# Patient Record
Sex: FEMALE | Race: WHITE | Employment: UNEMPLOYED | URBAN - METROPOLITAN AREA
[De-identification: names, ages, dates, MRNs, and addresses within clinical notes are randomized per-mention and may not be internally consistent; named-entity substitution may affect disease eponyms.]

---

## 2017-01-03 ENCOUNTER — HOSPITAL ENCOUNTER (EMERGENCY)
Age: 47
Discharge: HOME | End: 2017-01-03
Attending: EMERGENCY MEDICINE
Payer: SELF-PAY

## 2017-01-03 VITALS
DIASTOLIC BLOOD PRESSURE: 70 MMHG | SYSTOLIC BLOOD PRESSURE: 108 MMHG | HEART RATE: 72 BPM | TEMPERATURE: 98.3 F | WEIGHT: 165 LBS | OXYGEN SATURATION: 99 % | BODY MASS INDEX: 27.49 KG/M2 | RESPIRATION RATE: 18 BRPM | HEIGHT: 65 IN

## 2017-01-03 DIAGNOSIS — R10.13 ABDOMINAL PAIN, EPIGASTRIC: Primary | ICD-10-CM

## 2017-01-03 LAB
ALBUMIN SERPL BCP-MCNC: 3.9 G/DL (ref 3.5–5)
ALBUMIN/GLOB SERPL: 1 {RATIO} (ref 1.2–3.5)
ALP SERPL-CCNC: 59 U/L (ref 50–136)
ALT SERPL-CCNC: 25 U/L (ref 12–65)
ANION GAP BLD CALC-SCNC: 9 MMOL/L (ref 7–16)
AST SERPL W P-5'-P-CCNC: 19 U/L (ref 15–37)
ATRIAL RATE: 75 BPM
BASOPHILS # BLD AUTO: 0 K/UL (ref 0–0.2)
BASOPHILS # BLD: 1 % (ref 0–2)
BILIRUB SERPL-MCNC: 0.3 MG/DL (ref 0.2–1.1)
BUN SERPL-MCNC: 8 MG/DL (ref 6–23)
CALCIUM SERPL-MCNC: 10 MG/DL (ref 8.3–10.4)
CALCULATED P AXIS, ECG09: 75 DEGREES
CALCULATED R AXIS, ECG10: 53 DEGREES
CALCULATED T AXIS, ECG11: 47 DEGREES
CHLORIDE SERPL-SCNC: 109 MMOL/L (ref 98–107)
CO2 SERPL-SCNC: 24 MMOL/L (ref 21–32)
CREAT SERPL-MCNC: 0.59 MG/DL (ref 0.6–1)
DIAGNOSIS, 93000: NORMAL
DIASTOLIC BP, ECG02: NORMAL MMHG
DIFFERENTIAL METHOD BLD: NORMAL
EOSINOPHIL # BLD: 0.3 K/UL (ref 0–0.8)
EOSINOPHIL NFR BLD: 3 % (ref 0.5–7.8)
ERYTHROCYTE [DISTWIDTH] IN BLOOD BY AUTOMATED COUNT: 12.9 % (ref 11.9–14.6)
GLOBULIN SER CALC-MCNC: 4 G/DL (ref 2.3–3.5)
GLUCOSE SERPL-MCNC: 136 MG/DL (ref 65–100)
HCT VFR BLD AUTO: 43.8 % (ref 35.8–46.3)
HGB BLD-MCNC: 14.6 G/DL (ref 11.7–15.4)
IMM GRANULOCYTES # BLD: 0 K/UL (ref 0–0.5)
IMM GRANULOCYTES NFR BLD AUTO: 0.2 % (ref 0–5)
LIPASE SERPL-CCNC: 70 U/L (ref 73–393)
LYMPHOCYTES # BLD AUTO: 25 % (ref 13–44)
LYMPHOCYTES # BLD: 2 K/UL (ref 0.5–4.6)
MCH RBC QN AUTO: 32.5 PG (ref 26.1–32.9)
MCHC RBC AUTO-ENTMCNC: 33.3 G/DL (ref 31.4–35)
MCV RBC AUTO: 97.6 FL (ref 79.6–97.8)
MONOCYTES # BLD: 0.7 K/UL (ref 0.1–1.3)
MONOCYTES NFR BLD AUTO: 8 % (ref 4–12)
NEUTS SEG # BLD: 5.2 K/UL (ref 1.7–8.2)
NEUTS SEG NFR BLD AUTO: 63 % (ref 43–78)
P-R INTERVAL, ECG05: 174 MS
PLATELET # BLD AUTO: 206 K/UL (ref 150–450)
PMV BLD AUTO: 10.9 FL (ref 10.8–14.1)
POTASSIUM SERPL-SCNC: 3.7 MMOL/L (ref 3.5–5.1)
PROT SERPL-MCNC: 7.9 G/DL (ref 6.3–8.2)
Q-T INTERVAL, ECG07: 364 MS
QRS DURATION, ECG06: 74 MS
QTC CALCULATION (BEZET), ECG08: 406 MS
RBC # BLD AUTO: 4.49 M/UL (ref 4.05–5.25)
SODIUM SERPL-SCNC: 142 MMOL/L (ref 136–145)
SYSTOLIC BP, ECG01: NORMAL MMHG
TROPONIN I BLD-MCNC: 0 NG/ML (ref 0–0.08)
TROPONIN I BLD-MCNC: 0 NG/ML (ref 0–0.08)
VENTRICULAR RATE, ECG03: 75 BPM
WBC # BLD AUTO: 8.2 K/UL (ref 4.3–11.1)

## 2017-01-03 PROCEDURE — 74011000250 HC RX REV CODE- 250: Performed by: EMERGENCY MEDICINE

## 2017-01-03 PROCEDURE — 84484 ASSAY OF TROPONIN QUANT: CPT

## 2017-01-03 PROCEDURE — 85025 COMPLETE CBC W/AUTO DIFF WBC: CPT | Performed by: EMERGENCY MEDICINE

## 2017-01-03 PROCEDURE — 74011250637 HC RX REV CODE- 250/637: Performed by: EMERGENCY MEDICINE

## 2017-01-03 PROCEDURE — 99285 EMERGENCY DEPT VISIT HI MDM: CPT | Performed by: EMERGENCY MEDICINE

## 2017-01-03 PROCEDURE — 93005 ELECTROCARDIOGRAM TRACING: CPT | Performed by: EMERGENCY MEDICINE

## 2017-01-03 PROCEDURE — 83690 ASSAY OF LIPASE: CPT | Performed by: EMERGENCY MEDICINE

## 2017-01-03 PROCEDURE — 80053 COMPREHEN METABOLIC PANEL: CPT | Performed by: EMERGENCY MEDICINE

## 2017-01-03 RX ORDER — LIDOCAINE HYDROCHLORIDE 20 MG/ML
15 SOLUTION OROPHARYNGEAL
Status: COMPLETED | OUTPATIENT
Start: 2017-01-03 | End: 2017-01-03

## 2017-01-03 RX ORDER — OMEPRAZOLE 10 MG/1
10 CAPSULE, DELAYED RELEASE ORAL DAILY
Qty: 30 CAP | Refills: 2 | Status: SHIPPED | OUTPATIENT
Start: 2017-01-03 | End: 2017-02-02

## 2017-01-03 RX ADMIN — Medication 30 ML: at 07:31

## 2017-01-03 RX ADMIN — LIDOCAINE HYDROCHLORIDE 15 ML: 20 SOLUTION ORAL; TOPICAL at 07:31

## 2017-01-03 NOTE — ED PROVIDER NOTES
HPI Comments: Patient states she has been having epigastric pain since 11 PM.  She describes it as constant burning pressure seemingly worse when she lies flat. She has been eating and drinking without any difficulty. She denies similar pain in the past and did not take any medicine for her symptoms. She denies any shortness of breath or diaphoresis. She did have some nausea and vomiting earlier. Elements of this note were made using speech recognition software. As such, errors of speech recognition may be present. Patient is a 42 y.o. female presenting with epigastric pain. The history is provided by the patient. Epigastric Pain    Associated symptoms include nausea and vomiting. Pertinent negatives include no fever, no diarrhea and no constipation.         Past Medical History:   Diagnosis Date    Asthma     B12 deficiency     Bowel dysfunction     Cervical radiculopathy     Depression      pt states wrong info    Groin abscess     History of DVT (deep vein thrombosis) 2015     Left leg-tx with coumadin 2 years    Left leg swelling 2015    Migraine     Nipple discharge     Panic attacks     Post-phlebitic syndrome 2015    Spinal stenosis in cervical region     [facility]romboembolus [facility])      Left leg    Vitamin D deficiency        Past Surgical History:   Procedure Laterality Date    Hx craniotomy        due to head trauma    Hx  section  , , , 2003     x 4    Hx oophorectomy Left 1996    Hx hysterectomy      Hx hernia repair      Hx craniotomy       for head trauma         Family History:   Problem Relation Age of Onset    Breast Cancer Maternal Aunt 44    Other Mother      DVT, VV, coagulopathy    Cancer Mother      , colon, , stomach    Heart Disease Father     Dementia Maternal Grandmother     Other Maternal Grandmother      coagulopathy    Cancer Maternal Grandmother      thyroid, colon    Diabetes Maternal Grandfather  Other Sister      coagulopathy    No Known Problems Paternal Grandmother     No Known Problems Paternal Grandfather     No Known Problems Brother     No Known Problems Sister     No Known Problems Sister        Social History     Social History    Marital status:      Spouse name: N/A    Number of children: N/A    Years of education: N/A     Occupational History    Not on file. Social History Main Topics    Smoking status: Former Smoker     Quit date: 1/29/2010    Smokeless tobacco: Never Used    Alcohol use No      Comment: Social    Drug use: No    Sexual activity: Yes     Partners: Male     Birth control/ protection: Surgical     Other Topics Concern    Not on file     Social History Narrative         ALLERGIES: Aspirin; Ibuprofen; Other plant, animal, environmental; Prednisone; and Tramadol hcl    Review of Systems   Constitutional: Negative for chills and fever. Gastrointestinal: Positive for nausea and vomiting. Negative for constipation and diarrhea. All other systems reviewed and are negative. Vitals:    01/03/17 0553 01/03/17 0618 01/03/17 0626 01/03/17 0640   BP:   107/70 108/68   Pulse:  66 62 67   Resp:  15 14 16   Temp:       SpO2: 96% 96% 95% 95%   Weight:       Height:                Physical Exam   Constitutional: She is oriented to person, place, and time. She appears well-developed and well-nourished. HENT:   Head: Normocephalic and atraumatic. Eyes: Conjunctivae are normal. Pupils are equal, round, and reactive to light. Neck: Normal range of motion. Neck supple. Cardiovascular: Normal rate and regular rhythm. Pulmonary/Chest: Effort normal and breath sounds normal.   Abdominal: Soft. Bowel sounds are normal.   Musculoskeletal: She exhibits no edema or tenderness. Neurological: She is alert and oriented to person, place, and time. Skin: Skin is warm and dry. Psychiatric: She has a normal mood and affect.  Her behavior is normal.   Nursing note and vitals reviewed. MDM  Number of Diagnoses or Management Options  Diagnosis management comments: differential diagnosis: gastritis, biliary colic, pancreatitis, ACS  7:40 AM discussed results with the patient. She didn't have much of an improvement after the GI cocktail.   Her LACEY risk score is 0 and she has no risk factors for acute coronary syndrome and she has a primary doctor that she can follow up with       Amount and/or Complexity of Data Reviewed  Clinical lab tests: ordered and reviewed  Tests in the medicine section of CPT®: ordered and reviewed    Risk of Complications, Morbidity, and/or Mortality  Presenting problems: moderate  Diagnostic procedures: low  Management options: moderate    Patient Progress  Patient progress: stable    ED Course       Procedures

## 2017-01-03 NOTE — ED TRIAGE NOTES
Patient to ed with c/o epigastric burning--patient reports \"pressure\" sensation--patient reports radiation of pain to chin--patient reports c/o n/v/d--patient also reports pain to back

## 2017-01-03 NOTE — DISCHARGE INSTRUCTIONS
Indigestion (Dyspepsia or Heartburn): Care Instructions  Your Care Instructions  Sometimes it can be hard to pinpoint the cause of indigestion (dyspepsia or heartburn). Most cases of an upset stomach with bloating, burning, burping, and nausea are minor and go away within several hours. Home treatment and over-the-counter medicine often are able to control symptoms. But if you take medicine to relieve your indigestion without making diet and lifestyle changes, your symptoms are likely to return again and again. If you get indigestion often, it may be a sign of a more serious medical problem. Be sure to follow up with your doctor, who may want to do tests to be sure of the cause of your indigestion. Follow-up care is a key part of your treatment and safety. Be sure to make and go to all appointments, and call your doctor if you are having problems. Its also a good idea to know your test results and keep a list of the medicines you take. How can you care for yourself at home? · Your doctor may recommend over-the-counter medicine. For mild or occasional indigestion, antacids such as Tums, Gaviscon, Mylanta, or Maalox may help. Your doctor also may recommend over-the-counter acid reducers, such as Pepcid AC, Tagamet HB, Zantac 75, or Prilosec. Read and follow all instructions on the label. If you use these medicines often, talk with your doctor. · Change your eating habits. ¨ Its best to eat several small meals instead of two or three large meals. ¨ After you eat, wait 2 to 3 hours before you lie down. ¨ Chocolate, mint, and alcohol can make GERD worse. ¨ Spicy foods, foods that have a lot of acid (like tomatoes and oranges), and coffee can make GERD symptoms worse in some people. If your symptoms are worse after you eat a certain food, you may want to stop eating that food to see if your symptoms get better. · Do not smoke or chew tobacco. Smoking can make GERD worse.  If you need help quitting, talk to your doctor about stop-smoking programs and medicines. These can increase your chances of quitting for good. · If you have GERD symptoms at night, raise the head of your bed 6 to 8 inches by putting the frame on blocks or placing a foam wedge under the head of your mattress. (Adding extra pillows does not work.)  · Do not wear tight clothing around your middle. · Lose weight if you need to. Losing just 5 to 10 pounds can help. · Do not take anti-inflammatory medicines, such as aspirin, ibuprofen (Advil, Motrin), or naproxen (Aleve). These can irritate the stomach. If you need a pain medicine, try acetaminophen (Tylenol), which does not cause stomach upset. When should you call for help? Call 911 anytime you think you may need emergency care. For example, call if:  · You passed out (lost consciousness). · You vomit blood or what looks like coffee grounds. · You pass maroon or very bloody stools. · You have chest pain or pressure. This may occur with:  ¨ Sweating. ¨ Shortness of breath. ¨ Nausea or vomiting. ¨ Pain that spreads from the chest to the neck, jaw, or one or both shoulders or arms. ¨ Feeling dizzy or lightheaded. ¨ A fast or uneven pulse. After calling 911, chew 1 adult-strength aspirin. Wait for an ambulance. Do not try to drive yourself. Call your doctor now or seek immediate medical care if:  · You have severe belly pain. · Your stools are black and tarlike or have streaks of blood. · You have trouble swallowing. · You are losing weight and do not know why. Watch closely for changes in your health, and be sure to contact your doctor if:  · You do not get better as expected. Where can you learn more? Go to http://dionicio-marcella.info/. Enter Y172 in the search box to learn more about \"Indigestion (Dyspepsia or Heartburn): Care Instructions. \"  Current as of: August 9, 2016  Content Version: 11.1  © 2855-2793 [employer].  Care instructions adapted under license by [address] (which disclaims liability or warranty for this information). If you have questions about a medical condition or this instruction, always ask your healthcare professional. [address] any warranty or liability for your use of this information.